# Patient Record
Sex: FEMALE | Race: WHITE | NOT HISPANIC OR LATINO | Employment: FULL TIME | URBAN - METROPOLITAN AREA
[De-identification: names, ages, dates, MRNs, and addresses within clinical notes are randomized per-mention and may not be internally consistent; named-entity substitution may affect disease eponyms.]

---

## 2017-05-10 ENCOUNTER — GYNECOLOGY VISIT (OUTPATIENT)
Dept: OBGYN | Facility: MEDICAL CENTER | Age: 62
End: 2017-05-10
Payer: COMMERCIAL

## 2017-05-10 VITALS
HEIGHT: 64 IN | BODY MASS INDEX: 30.66 KG/M2 | DIASTOLIC BLOOD PRESSURE: 70 MMHG | WEIGHT: 179.6 LBS | SYSTOLIC BLOOD PRESSURE: 124 MMHG

## 2017-05-10 DIAGNOSIS — Z11.51 SCREENING FOR HUMAN PAPILLOMAVIRUS: ICD-10-CM

## 2017-05-10 DIAGNOSIS — N95.1 MENOPAUSAL HOT FLUSHES: ICD-10-CM

## 2017-05-10 DIAGNOSIS — Z12.31 VISIT FOR SCREENING MAMMOGRAM: ICD-10-CM

## 2017-05-10 DIAGNOSIS — Z01.419 WELL FEMALE EXAM WITH ROUTINE GYNECOLOGICAL EXAM: ICD-10-CM

## 2017-05-10 DIAGNOSIS — Z12.4 SCREENING FOR MALIGNANT NEOPLASM OF CERVIX: ICD-10-CM

## 2017-05-10 PROCEDURE — 99396 PREV VISIT EST AGE 40-64: CPT | Performed by: OBSTETRICS & GYNECOLOGY

## 2017-05-10 NOTE — PROGRESS NOTES
Arcelia Gray is a 61 y.o. y.o. female who presents for her Gynecologic Exam        HPI Comments: Pt presents for well woman exam. Pt has complaints of severe hot flushes and vaginal dryness. No LMP recorded. Patient has had a hysterectomy.  .  Review of Systems   Pertinent positives documented in HPI and all other systems reviewed & are negative    All PMH, PSH, allergies, social history and FH reviewed and updated today:  Past Medical History   Diagnosis Date   • Hormone replacement therapy, postmenopausal    • Dyslipidemia    • Herpes zoster    • Osteopenia    • Chest pain at rest 11/14/2013   • Menopausal vaginal dryness 11/14/2013   • Arthritis      Past Surgical History   Procedure Laterality Date   • Colonoscopy  2009     normal   • Abdominal hysterectomy total       wth BSO due to fibroids   • Mammoplasty augmentation     • Other       L index finger bone growth excision   • Pr enlarge breast with implant       Other environmental and Zoloft  Social History     Social History   • Marital Status:      Spouse Name: N/A   • Number of Children: N/A   • Years of Education: N/A     Occupational History   •       Social History Main Topics   • Smoking status: Never Smoker    • Smokeless tobacco: Never Used   • Alcohol Use: Yes      Comment: rare   • Drug Use: No   • Sexual Activity:     Partners: Male     Birth Control/ Protection: Surgical     Other Topics Concern   • None     Social History Narrative     Family History   Problem Relation Age of Onset   • Arthritis Sister      lupus   • Heart Disease Brother    • Thyroid Sister    • Diabetes Sister    • Diabetes Brother      Medications:   Current Outpatient Prescriptions Ordered in [facility]   Medication Sig Dispense Refill   • estradiol (CLIMARA) 0.1 MG/24HR PATCH WEEKLY Apply 1 Patch to skin as directed every 7 days. 4 Patch 12   • naproxen (NAPROSYN) 500 MG TABS Take 1 Tab by mouth 2 times a day, with meals. 24 Tab 0   •  "hydrocodone-acetaminophen (NORCO) 5-325 MG TABS per tablet Take 1-2 Tabs by mouth every 6 hours as needed. 20 Tab 0   • methocarbamol (ROBAXIN) 750 MG TABS Take 1-2 Tabs by mouth 3 times a day as needed. 18 Tab 0   • estradiol (VAGIFEM) 10 MCG TABS Insert 1 Tab in vagina every day. 8 Tab 11   • estradiol (MINIVELLE) 0.1 MG/24HR PTTW Apply every 3.5 days 12 Patch 11   • fluoxetine (PROZAC) 20 MG CAPS Take 1 Cap by mouth every day. 30 Cap 3   • hydrocodone-acetaminophen (NORCO) 5-325 MG TABS per tablet Take 1 Tab by mouth every 6 hours as needed. 20 Each 0   • esterified estrogens-methyltest (ESTRATEST HS) 0.625-1.25 MG TABS Take 1 Tab by mouth every day. 30 Tab 11   • vitamin D (CHOLECALCIFEROL) 1000 UNIT TABS Take 1,000 Units by mouth every day.     • fluoxetine (PROZAC) 20 MG CAPS Take 1 Cap by mouth every day. 30 Cap 5     No current Epic-ordered facility-administered medications on file.          Objective:   Vital measurements:  Blood pressure 124/70, height 1.626 m (5' 4\"), weight 81.466 kg (179 lb 9.6 oz), not currently breastfeeding.  Body mass index is 30.81 kg/(m^2). (Goal BM I>18 <25)    Physical Exam   Nursing note and vitals reviewed.  Constitutional: She is oriented to person, place, and time. She appears well-developed and well-nourished. No distress.     HEENT:   Head: Normocephalic and atraumatic.   Right Ear: External ear normal.   Left Ear: External ear normal.   Nose: Nose normal.   Eyes: Conjunctivae and EOM are normal. Pupils are equal, round, and reactive to light. No scleral icterus.     Neck: Normal range of motion. Neck supple. No tracheal deviation present. No thyromegaly present.     Pulmonary/Chest: Effort normal and breath sounds normal. No respiratory distress. She has no wheezes. She has no rales. She exhibits no tenderness.     Cardiovascular: Regular, rate and rhythm. No JVD.    Abdominal: Soft. Bowel sounds are normal. She exhibits no distension and no mass. No tenderness. She has " no rebound and no guarding.     Breast:  Symmetrical, normal consistency without masses., No dimpling or skin changes, Normal nipples without discharge, no axillary lymphadenopathy, negative    Genitourinary:  Pelvic exam was performed with patient supine.  External genitalia with no abnormal pigmentation, labial fusion,rash, tenderness, lesion or injury to the labia bilaterally.  Vagina is dry with no lesions, foul discharge, erythema, tenderness or bleeding. No foreign body around the vagina or signs of injury.   Right adnexum displays no mass, no tenderness and no fullness. Left adnexum displays no mass, no tenderness and no fullness.     Musculoskeletal: Normal range of motion. She exhibits no edema and no tenderness.     Lymphadenopathy: She has no cervical adenopathy.     Neurological: She is alert and oriented to person, place, and time. She exhibits normal muscle tone.     Skin: Skin is warm and dry. No rash noted. She is not diaphoretic. No erythema. No pallor.     Psychiatric: She has a normal mood and affect. Her behavior is normal. Judgment and thought content normal.               Assessment:     1. Well female exam with routine gynecological exam     2. Screening for malignant neoplasm of cervix     3. Screening for human papillomavirus     4. Visit for screening mammogram     5. Menopausal hot flushes  estradiol (CLIMARA) 0.1 MG/24HR PATCH WEEKLY         Plan:   Physical exam performed  Monthly SBE.  Counseling: breast self exam, mammography screening, use and side effects of HRT, menopause, osteoporosis and adequate intake of calcium and vitamin D  Encourage exercise and proper diet.  Mammograms starting @ age 40 annually.  See medications and orders placed in encounter report.

## 2017-05-10 NOTE — MR AVS SNAPSHOT
"        Arcelia Isaac   5/10/2017 2:30 PM   Gynecology Visit   MRN: [id number]    Department:  [facility]   Dept Phone:  [phone]    Description:  Female : 1955   Provider:  Nata Humphrey M.D.           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 Smoking Status    124/70 mmHg 1.626 m (5' 4\") 81.466 kg (179 lb 9.6 oz) 30.81 kg/m2 No Never Smoker       Basic Information     Date Of Birth Sex Race Ethnicity Preferred Language    1955 Female White Non- English      Problem List              ICD-10-CM Priority Class Noted - Resolved    Hormone replacement therapy, postmenopausal Z79.890   Unknown - Present    Dyslipidemia E78.5   Unknown - Present    Herpes zoster B02.9   Unknown - Present    Osteopenia M85.80   Unknown - Present    Numbness and tingling in both hands R20.0, R20.2   2012 - Present    Neuropathic pain M79.2   2012 - Present    Carpal tunnel syndrome G56.00   2012 - Present    Radiculitis, cervical M54.12   2012 - Present    Radicular pain in right arm M79.2   2012 - Present    Radicular pain in left arm M79.2   2012 - Present    Chest pain, atypical R07.89   2013 - Present    Menopausal vaginal dryness N95.1   2013 - Present    Vitamin D insufficiency E55.9   2014 - Present      Health Maintenance        Date Due Completion Dates    IMM ZOSTER VACCINE 2015 ---    MAMMOGRAM 3/30/2018 3/30/2017 (Done), 2015 " (Done), 12/19/2014, 5/8/2013, 3/20/2012, 4/2/2008, 4/2/2008, 1/19/2006, 1/17/2005, 1/14/2004    Override on 3/30/2017: Done (RDC)    Override on 12/21/2015: Done    COLONOSCOPY 3/20/2022 3/20/2012 (Prv Comp)    Override on 3/20/2012: Previously completed (2009)    IMM DTaP/Tdap/Td Vaccine (2 - Td) 3/20/2022 3/20/2012            Current Immunizations     Influenza Vaccine Quad Inj (Preserved) 11/5/2014    Tdap Vaccine 3/20/2012      Below and/or attached are the medications your provider expects you to take. Review all of your home medications and newly ordered medications with your provider and/or pharmacist. Follow medication instructions as directed by your provider and/or pharmacist. Please keep your medication list with you and share with your provider. Update the information when medications are discontinued, doses are changed, or new medications (including over-the-counter products) are added; and carry medication information at all times in the event of emergency situations     Allergies:  OTHER ENVIRONMENTAL - (reactions not documented)     ZOLOFT - (reactions not documented)               Medications  Valid as of: May 10, 2017 -  3:38 PM    Generic Name Brand Name Tablet Size Instructions for use    Cholecalciferol (Tab) cholecalciferol 1000 UNIT Take 1,000 Units by mouth every day.        Est Estrogens-Methyltest (Tab) ESTRATEST HS 0.625-1.25 MG Take 1 Tab by mouth every day.        Estradiol (Tab) VAGIFEM 10 MCG Insert 1 Tab in vagina every day.        Estradiol (PATCH BIWEEKLY) VIVELLE DOT 0.1 MG/24HR Apply every 3.5 days        Estradiol (PATCH WEEKLY) CLIMARA 0.1 MG/24HR Apply 1 Patch to skin as directed every 7 days.        FLUoxetine HCl (Cap) PROZAC 20 MG Take 1 Cap by mouth every day.        FLUoxetine HCl (Cap) PROZAC 20 MG Take 1 Cap by mouth every day.        Hydrocodone-Acetaminophen (Tab) NORCO 5-325 MG Take 1 Tab by mouth every 6 hours as needed.        Hydrocodone-Acetaminophen (Tab) NORCO  5-325 MG Take 1-2 Tabs by mouth every 6 hours as needed.        Methocarbamol (Tab) ROBAXIN 750 MG Take 1-2 Tabs by mouth 3 times a day as needed.        Naproxen (Tab) NAPROSYN 500 MG Take 1 Tab by mouth 2 times a day, with meals.        .                 Medicines prescribed today were sent to:     [id number] - [place], [place] - [address] [facility] [address]    [address] [place] [address]    Phone: [phone] Fax: [phone]    Open 24 Hours?: No    [id number] - [place], [place] - [address] AT [facility] [address]    [address] [place] [address]    Phone: [phone] Fax: [phone]    Open 24 Hours?: No      Medication refill instructions:       If your prescription bottle indicates you have medication refills left, it is not necessary to call your provider’s office. Please contact your pharmacy and they will refill your medication.    If your prescription bottle indicates you do not have any refills left, you may request refills at any time through one of the following ways: The online UpDown system (except Urgent Care), by calling your provider’s office, or by asking your pharmacy to contact your provider’s office with a refill request. Medication refills are processed only during regular business hours and may not be available until the next business day. Your provider may request additional information or to have a follow-up visit with you prior to refilling your medication.   *Please Note: Medication refills are assigned a new Rx number when refilled electronically. Your pharmacy may indicate that no refills were authorized even though a new prescription for the same medication is available at the pharmacy. Please request the medicine by name with the pharmacy before contacting your provider for a refill.        Instructions    Start Climara patch          UpDown Access Code: [email or web address]  Expires: 6/9/2017  3:38  PM    RETAIL PROhart  A secure, online tool to manage your health information     [employer]’s Acteavo® is a secure, online tool that connects you to your personalized health information from the privacy of your home -- day or night - making it very easy for you to manage your healthcare. Once the activation process is completed, you can even access your medical information using the Acteavo jennifer, which is available for free in the Apple Jennifer store or Google Play store.     Acteavo provides the following levels of access (as shown below):   My Chart Features   [facility] Doctor [facility]  Specialists [facility]  Urgent  Care Non-[facility]  Primary Care  Doctor   Email your healthcare team securely and privately 24/7 X X X    Manage appointments: schedule your next appointment; view details of past/upcoming appointments X      Request prescription refills. X      View recent personal medical records, including lab and immunizations X X X X   View health record, including health history, allergies, medications X X X X   Read reports about your outpatient visits, procedures, consult and ER notes X X X X   See your discharge summary, which is a recap of your hospital and/or ER visit that includes your diagnosis, lab results, and care plan. X X       How to register for Acteavo:  1. Go to  [email or web address].  2. Click on the Sign Up Now box, which takes you to the New Member Sign Up page. You will need to provide the following information:  a. Enter your Acteavo Access Code exactly as it appears at the top of this page. (You will not need to use this code after you’ve completed the sign-up process. If you do not sign up before the expiration date, you must request a new code.)   b. Enter your date of birth.   c. Enter your home email address.   d. Click Submit, and follow the next screen’s instructions.  3. Create a Acteavo ID. This will be your Acteavo login ID and cannot be changed, so think of one that is secure and  easy to remember.  4. Create a [employer] password. You can change your password at any time.  5. Enter your Password Reset Question and Answer. This can be used at a later time if you forget your password.   6. Enter your e-mail address. This allows you to receive e-mail notifications when new information is available in [employer].  7. Click Sign Up. You can now view your health information.    For assistance activating your [employer] account, call [phone]

## 2017-10-12 ENCOUNTER — TELEPHONE (OUTPATIENT)
Dept: OBGYN | Facility: MEDICAL CENTER | Age: 62
End: 2017-10-12

## 2017-10-12 NOTE — TELEPHONE ENCOUNTER
Pt called and stated that she has been having a reaction to the patches and would like to have the pill form instead of the patch. The pharmacy is the [facility]. Thank you

## 2017-10-20 NOTE — TELEPHONE ENCOUNTER
Nata Humphrey M.D.   You 2 days ago      Please call in Estradiol 1 mg QD (Routing comment)       You   Nata Humphrey M.D. 8 days ago      Dr Humphrey, please advised on which pills I can call out for pt. Thank you.  (Routing comment)          rx has been called out to pharmacy on file.     Pt has been notified via .

## 2017-10-25 RX ORDER — ESTRADIOL 1 MG/1
TABLET ORAL
Qty: 30 TAB | Refills: 6 | Status: SHIPPED | OUTPATIENT
Start: 2017-10-25

## 2018-03-02 ENCOUNTER — HOSPITAL ENCOUNTER (OUTPATIENT)
Dept: HOSPITAL 8 - CFH | Age: 63
Discharge: HOME | End: 2018-03-02
Attending: NURSE PRACTITIONER
Payer: COMMERCIAL

## 2018-03-02 DIAGNOSIS — N64.4: Primary | ICD-10-CM

## 2018-03-02 PROCEDURE — 76642 ULTRASOUND BREAST LIMITED: CPT

## 2018-07-02 ENCOUNTER — TELEPHONE (OUTPATIENT)
Dept: OBGYN | Facility: CLINIC | Age: 63
End: 2018-07-02

## 2018-07-02 NOTE — TELEPHONE ENCOUNTER
Called RX to pharmacy 90 supple with 1 refill. Pt needs to schedule annual exam with another provider from our office. No answer, Left message for patient to call back and schedule.

## 2020-03-07 ENCOUNTER — HOSPITAL ENCOUNTER (OUTPATIENT)
Dept: HOSPITAL 8 - LAB | Age: 65
Discharge: HOME | End: 2020-03-07
Attending: NURSE PRACTITIONER
Payer: OTHER GOVERNMENT

## 2020-03-07 DIAGNOSIS — E78.5: Primary | ICD-10-CM

## 2020-03-07 LAB
ALBUMIN SERPL-MCNC: 3.5 G/DL (ref 3.4–5)
ALP SERPL-CCNC: 118 U/L (ref 45–117)
ALT SERPL-CCNC: 20 U/L (ref 12–78)
ANION GAP SERPL CALC-SCNC: 6 MMOL/L (ref 5–15)
BILIRUB SERPL-MCNC: 1.1 MG/DL (ref 0.2–1)
CALCIUM SERPL-MCNC: 8.3 MG/DL (ref 8.5–10.1)
CHLORIDE SERPL-SCNC: 110 MMOL/L (ref 98–107)
CHOL/HDL RATIO: 3.3
CREAT SERPL-MCNC: 0.94 MG/DL (ref 0.55–1.02)
HDL CHOL %: 31 % (ref 28–40)
HDL CHOLESTEROL (DIRECT): 60 MG/DL (ref 40–60)
LDL CHOLESTEROL,CALCULATED: 119 MG/DL (ref 54–169)
LDLC/HDLC SERPL: 2 {RATIO} (ref 0.5–3)
PROT SERPL-MCNC: 7.2 G/DL (ref 6.4–8.2)
TRIGL SERPL-MCNC: 85 MG/DL (ref 50–200)
VLDLC SERPL CALC-MCNC: 17 MG/DL (ref 0–25)

## 2020-03-07 PROCEDURE — 80061 LIPID PANEL: CPT

## 2020-03-07 PROCEDURE — 80053 COMPREHEN METABOLIC PANEL: CPT

## 2020-03-07 PROCEDURE — 36415 COLL VENOUS BLD VENIPUNCTURE: CPT

## 2021-03-03 DIAGNOSIS — Z23 NEED FOR VACCINATION: ICD-10-CM
